# Patient Record
Sex: MALE | Race: WHITE | NOT HISPANIC OR LATINO
[De-identification: names, ages, dates, MRNs, and addresses within clinical notes are randomized per-mention and may not be internally consistent; named-entity substitution may affect disease eponyms.]

---

## 2021-04-22 ENCOUNTER — NON-APPOINTMENT (OUTPATIENT)
Age: 41
End: 2021-04-22

## 2021-04-22 ENCOUNTER — RX RENEWAL (OUTPATIENT)
Age: 41
End: 2021-04-22

## 2021-04-22 ENCOUNTER — APPOINTMENT (OUTPATIENT)
Dept: PULMONOLOGY | Facility: CLINIC | Age: 41
End: 2021-04-22
Payer: COMMERCIAL

## 2021-04-22 VITALS
WEIGHT: 250 LBS | HEART RATE: 93 BPM | BODY MASS INDEX: 35 KG/M2 | OXYGEN SATURATION: 100 % | HEIGHT: 71 IN | TEMPERATURE: 97.3 F | DIASTOLIC BLOOD PRESSURE: 88 MMHG | SYSTOLIC BLOOD PRESSURE: 133 MMHG

## 2021-04-22 DIAGNOSIS — Z01.812 ENCOUNTER FOR PREPROCEDURAL LABORATORY EXAMINATION: ICD-10-CM

## 2021-04-22 DIAGNOSIS — Z80.3 FAMILY HISTORY OF MALIGNANT NEOPLASM OF BREAST: ICD-10-CM

## 2021-04-22 DIAGNOSIS — Z20.822 ENCOUNTER FOR PREPROCEDURAL LABORATORY EXAMINATION: ICD-10-CM

## 2021-04-22 PROBLEM — Z00.00 ENCOUNTER FOR PREVENTIVE HEALTH EXAMINATION: Status: ACTIVE | Noted: 2021-04-22

## 2021-04-22 PROCEDURE — 99072 ADDL SUPL MATRL&STAF TM PHE: CPT

## 2021-04-22 PROCEDURE — 71046 X-RAY EXAM CHEST 2 VIEWS: CPT

## 2021-04-22 PROCEDURE — 99244 OFF/OP CNSLTJ NEW/EST MOD 40: CPT | Mod: 25

## 2021-04-22 RX ORDER — METFORMIN HYDROCHLORIDE 625 MG/1
TABLET ORAL
Refills: 0 | Status: ACTIVE | COMMUNITY

## 2021-04-22 NOTE — ASSESSMENT
[FreeTextEntry1] : possibly allergy related?\par \par trial of flonase and claritin D daily\par follow up for PFT

## 2021-04-22 NOTE — HISTORY OF PRESENT ILLNESS
[Current] : current [TextBox_4] : 41M HTN, HLD\par \par cough x 2 months\par took medrol pack without relief\par worse at night when lying down\par feels mucous in the throat\par no prior pulm hx\par denies asthma as a child/frequent infections\par denies GERD\par no associated shortness of breath\par \par works as a manager at [employer], no toxic exposures\par \par infrequent social smoking.

## 2021-04-22 NOTE — CONSULT LETTER
[FreeTextEntry1] : Dear  ,\par \par I had the pleasure of evaluating your patient, CARLEY WISEMAN today in pulmonary consultation.  Please refer to my attached note for my findings and recommendations.\par \par \par Thank you for allowing me to participate in the care of your patient, please feel free to call with any questions or concerns.\par \par \par Sincerely,\par \par Erum Mcnluty MD\par [facility] \par [facility]\par \par

## 2021-05-13 ENCOUNTER — APPOINTMENT (OUTPATIENT)
Dept: PULMONOLOGY | Facility: CLINIC | Age: 41
End: 2021-05-13
Payer: COMMERCIAL

## 2021-05-13 ENCOUNTER — RX RENEWAL (OUTPATIENT)
Age: 41
End: 2021-05-13

## 2021-05-13 VITALS
OXYGEN SATURATION: 99 % | HEART RATE: 90 BPM | SYSTOLIC BLOOD PRESSURE: 139 MMHG | DIASTOLIC BLOOD PRESSURE: 89 MMHG | TEMPERATURE: 96.7 F

## 2021-05-13 PROCEDURE — 99072 ADDL SUPL MATRL&STAF TM PHE: CPT

## 2021-05-13 PROCEDURE — 99214 OFFICE O/P EST MOD 30 MIN: CPT

## 2021-05-13 RX ORDER — CETIRIZINE HCL 10 MG
TABLET ORAL
Refills: 0 | Status: ACTIVE | COMMUNITY

## 2021-05-13 RX ORDER — OMEPRAZOLE 40 MG/1
40 CAPSULE, DELAYED RELEASE ORAL
Qty: 90 | Refills: 0 | Status: ACTIVE | COMMUNITY
Start: 2021-05-13 | End: 1900-01-01

## 2021-05-13 NOTE — ASSESSMENT
[FreeTextEntry1] : cont zyrtec, flonase\par add PPI for possible gerd\par trial of PRN albuterol\par fu for PFT, bring albuterol for test

## 2021-05-13 NOTE — PROCEDURE
[FreeTextEntry1] : Prior exam reviewed:\par CXR: clear lungs, no focal consolidation or pleural effusions, cardiac silhouette appears normal.  No bony abnormality.\par

## 2021-05-13 NOTE — HISTORY OF PRESENT ILLNESS
[TextBox_4] : still has cough\par mostly when lays down at night\par taking flonase and zyrtec\par no change\par no dyspnea, no wheeze

## 2021-09-22 ENCOUNTER — APPOINTMENT (OUTPATIENT)
Dept: PULMONOLOGY | Facility: CLINIC | Age: 41
End: 2021-09-22

## 2021-09-23 LAB — SARS-COV-2 N GENE NPH QL NAA+PROBE: NOT DETECTED

## 2021-09-27 ENCOUNTER — APPOINTMENT (OUTPATIENT)
Dept: PULMONOLOGY | Facility: CLINIC | Age: 41
End: 2021-09-27
Payer: COMMERCIAL

## 2021-09-27 ENCOUNTER — NON-APPOINTMENT (OUTPATIENT)
Age: 41
End: 2021-09-27

## 2021-09-27 VITALS
DIASTOLIC BLOOD PRESSURE: 83 MMHG | SYSTOLIC BLOOD PRESSURE: 129 MMHG | WEIGHT: 254 LBS | TEMPERATURE: 97.8 F | HEIGHT: 71 IN | HEART RATE: 85 BPM | RESPIRATION RATE: 15 BRPM | BODY MASS INDEX: 35.56 KG/M2 | OXYGEN SATURATION: 94 %

## 2021-09-27 LAB — POCT - HEMOGLOBIN (HGB), QUANTITATIVE, TRANSCUTANEOUS: 13.5

## 2021-09-27 PROCEDURE — 94727 GAS DIL/WSHOT DETER LNG VOL: CPT

## 2021-09-27 PROCEDURE — 94729 DIFFUSING CAPACITY: CPT

## 2021-09-27 PROCEDURE — 94060 EVALUATION OF WHEEZING: CPT

## 2021-09-27 PROCEDURE — 88738 HGB QUANT TRANSCUTANEOUS: CPT

## 2021-10-05 ENCOUNTER — APPOINTMENT (OUTPATIENT)
Dept: PULMONOLOGY | Facility: CLINIC | Age: 41
End: 2021-10-05
Payer: COMMERCIAL

## 2021-10-05 ENCOUNTER — LABORATORY RESULT (OUTPATIENT)
Age: 41
End: 2021-10-05

## 2021-10-05 ENCOUNTER — RX RENEWAL (OUTPATIENT)
Age: 41
End: 2021-10-05

## 2021-10-05 VITALS
HEIGHT: 71 IN | HEART RATE: 73 BPM | OXYGEN SATURATION: 98 % | TEMPERATURE: 97.8 F | DIASTOLIC BLOOD PRESSURE: 84 MMHG | SYSTOLIC BLOOD PRESSURE: 124 MMHG

## 2021-10-05 DIAGNOSIS — Z23 ENCOUNTER FOR IMMUNIZATION: ICD-10-CM

## 2021-10-05 PROCEDURE — 36415 COLL VENOUS BLD VENIPUNCTURE: CPT

## 2021-10-05 PROCEDURE — 90686 IIV4 VACC NO PRSV 0.5 ML IM: CPT

## 2021-10-05 PROCEDURE — 99214 OFFICE O/P EST MOD 30 MIN: CPT | Mod: 25

## 2021-10-05 PROCEDURE — G0008: CPT

## 2021-10-05 NOTE — PHYSICAL EXAM
[No Acute Distress] : no acute distress [Normal Oropharynx] : normal oropharynx [II] : Mallampati Class: II [Normal Appearance] : normal appearance [Supple] : supple [No JVD] : no jvd [Normal Rate/Rhythm] : normal rate/rhythm [Normal S1, S2] : normal s1, s2 [No Murmurs] : no murmurs [No Rubs] : no rubs [No Gallops] : no gallops [No Resp Distress] : no resp distress [No Acc Muscle Use] : no acc muscle use [Normal Palpation] : normal palpation [Normal Rhythm and Effort] : normal rhythm and effort [Clear to Auscultation Bilaterally] : clear to auscultation bilaterally [No Abnormalities] : no abnormalities [Benign] : benign [Not Tender] : not tender [Soft] : soft [No HSM] : no hsm [Normal Bowel Sounds] : normal bowel sounds [No Clubbing] : no clubbing [No Cyanosis] : no cyanosis [No Edema] : no edema [FROM] : FROM [Normal Color/ Pigmentation] : normal color/ pigmentation [No Focal Deficits] : no focal deficits [Oriented x3] : oriented x3 [Normal Affect] : normal affect

## 2021-10-05 NOTE — PROCEDURE
[FreeTextEntry1] : Prior exam reviewed:\par CXR: clear lungs, no focal consolidation or pleural effusions, cardiac silhouette appears normal.  No bony abnormality.\par \par PFT 9/27/21\par Mild to moderate restrictive ventilatory impairment\par No bronchodilator response in FEV1\par Total capacity 70% predicted\par Diffusion 76% predicted low normal range\par Cannot exclude restrictive ventilatory impairment\par HGB 13.5\par Clinical correlation advised\par Question weight patient 254 pounds contributing to the restrictive component \par \par Flu Vaccine IM 10/5/2021

## 2021-10-05 NOTE — DISCUSSION/SUMMARY
[FreeTextEntry1] : Subacute chronic cough\par Restrictive ventilatory impairment\par Cannot exclude underlying component of inflammatory airways disease\par Does have pets in the home\par Rule out cat dog allergy\par \par Recommendations we will try program with controller therapy\par Symbicort 160–4.5 MCG 2 puffs twice daily with instructions to rinse\par Generic Singulair 10 mg 1 tablet p.o. daily with food\par As needed short acting bronchodilator\par Asthma profile blood work\par Office follow-up\par Readdress PPI if symptoms do not resolve\par

## 2021-10-05 NOTE — HISTORY OF PRESENT ILLNESS
[TextBox_4] : still has cough\par Congestion with clear mucus\par No hemoptysis purulent sputum foul-smelling sputum\par pos  wheeze\par mostly when lays down at night\par taking flonase and zyrtec\par no change\par no dyspnea, \par Treatment and use of rescue inhaler unclear if there is any significant improvement\par No fevers chills or sweats\par

## 2021-10-06 ENCOUNTER — NON-APPOINTMENT (OUTPATIENT)
Age: 41
End: 2021-10-06

## 2021-10-06 LAB
BASOPHILS # BLD AUTO: 0.04 K/UL
BASOPHILS NFR BLD AUTO: 0.6 %
EOSINOPHIL # BLD AUTO: 0.16 K/UL
EOSINOPHIL NFR BLD AUTO: 2.5 %
HCT VFR BLD CALC: 42.5 %
HGB BLD-MCNC: 12.6 G/DL
IMM GRANULOCYTES NFR BLD AUTO: 0.5 %
LYMPHOCYTES # BLD AUTO: 2.36 K/UL
LYMPHOCYTES NFR BLD AUTO: 37.4 %
MAN DIFF?: NORMAL
MCHC RBC-ENTMCNC: 22.5 PG
MCHC RBC-ENTMCNC: 29.6 GM/DL
MCV RBC AUTO: 75.8 FL
MONOCYTES # BLD AUTO: 0.45 K/UL
MONOCYTES NFR BLD AUTO: 7.1 %
NEUTROPHILS # BLD AUTO: 3.27 K/UL
NEUTROPHILS NFR BLD AUTO: 51.9 %
PLATELET # BLD AUTO: 235 K/UL
RBC # BLD: 5.61 M/UL
RBC # FLD: 15.6 %
WBC # FLD AUTO: 6.31 K/UL

## 2021-10-09 ENCOUNTER — NON-APPOINTMENT (OUTPATIENT)
Age: 41
End: 2021-10-09

## 2021-10-09 DIAGNOSIS — Z91.018 ALLERGY TO OTHER FOODS: ICD-10-CM

## 2021-10-09 DIAGNOSIS — Z91.010 ALLERGY TO PEANUTS: ICD-10-CM

## 2021-10-09 LAB
A ALTERNATA IGE QN: <0.1 KUA/L
A FUMIGATUS IGE QN: <0.1 KUA/L
C ALBICANS IGE QN: <0.1 KUA/L
C HERBARUM IGE QN: <0.1 KUA/L
CAT DANDER IGE QN: 0.14 KUA/L
CLAM IGE QN: <0.1 KUA/L
CODFISH IGE QN: <0.1 KUA/L
COMMON RAGWEED IGE QN: 0.44 KUA/L
CORN IGE QN: 0.11 KUA/L
COW MILK IGE QN: <0.1 KUA/L
D FARINAE IGE QN: <0.1 KUA/L
D PTERONYSS IGE QN: <0.1 KUA/L
DEPRECATED A ALTERNATA IGE RAST QL: 0
DEPRECATED A FUMIGATUS IGE RAST QL: 0
DEPRECATED C ALBICANS IGE RAST QL: 0
DEPRECATED C HERBARUM IGE RAST QL: 0
DEPRECATED CAT DANDER IGE RAST QL: NORMAL
DEPRECATED CLAM IGE RAST QL: 0
DEPRECATED CODFISH IGE RAST QL: 0
DEPRECATED COMMON RAGWEED IGE RAST QL: 1
DEPRECATED CORN IGE RAST QL: NORMAL
DEPRECATED COW MILK IGE RAST QL: 0
DEPRECATED D FARINAE IGE RAST QL: 0
DEPRECATED D PTERONYSS IGE RAST QL: 0
DEPRECATED DOG DANDER IGE RAST QL: 0
DEPRECATED EGG WHITE IGE RAST QL: 0
DEPRECATED M RACEMOSUS IGE RAST QL: 0
DEPRECATED PEANUT IGE RAST QL: 1
DEPRECATED ROACH IGE RAST QL: 3
DEPRECATED SCALLOP IGE RAST QL: 0.15 KUA/L
DEPRECATED SESAME SEED IGE RAST QL: 1
DEPRECATED SHRIMP IGE RAST QL: 0
DEPRECATED SOYBEAN IGE RAST QL: NORMAL
DEPRECATED TIMOTHY IGE RAST QL: 1
DEPRECATED WALNUT IGE RAST QL: 0
DEPRECATED WHEAT IGE RAST QL: 1
DEPRECATED WHITE OAK IGE RAST QL: 2
DOG DANDER IGE QN: <0.1 KUA/L
EGG WHITE IGE QN: <0.1 KUA/L
M RACEMOSUS IGE QN: <0.1 KUA/L
PEANUT IGE QN: 0.47 KUA/L
ROACH IGE QN: 3.77 KUA/L
SCALLOP IGE QN: <0.1 KUA/L
SCALLOP IGE QN: NORMAL
SESAME SEED IGE QN: 0.4 KUA/L
SOYBEAN IGE QN: 0.11 KUA/L
TIMOTHY IGE QN: 0.63 KUA/L
WALNUT IGE QN: <0.1 KUA/L
WHEAT IGE QN: 0.43 KUA/L
WHITE OAK IGE QN: 1.49 KUA/L

## 2021-10-27 ENCOUNTER — APPOINTMENT (OUTPATIENT)
Dept: INTERNAL MEDICINE | Facility: CLINIC | Age: 41
End: 2021-10-27
Payer: COMMERCIAL

## 2021-10-27 ENCOUNTER — NON-APPOINTMENT (OUTPATIENT)
Age: 41
End: 2021-10-27

## 2021-10-27 VITALS
BODY MASS INDEX: 36.32 KG/M2 | WEIGHT: 253.73 LBS | HEART RATE: 68 BPM | TEMPERATURE: 97 F | DIASTOLIC BLOOD PRESSURE: 79 MMHG | SYSTOLIC BLOOD PRESSURE: 123 MMHG | HEIGHT: 70.08 IN | OXYGEN SATURATION: 98 %

## 2021-10-27 DIAGNOSIS — D64.9 ANEMIA, UNSPECIFIED: ICD-10-CM

## 2021-10-27 PROCEDURE — 99396 PREV VISIT EST AGE 40-64: CPT | Mod: 25

## 2021-10-27 PROCEDURE — 36415 COLL VENOUS BLD VENIPUNCTURE: CPT

## 2021-10-27 NOTE — ASSESSMENT
[FreeTextEntry1] : Patient presents for annual physical blood work to be done today advised to continue with recommendations as per pulmonology\par Continue with lifestyle modification for diet and exercise\par Advised dental care\par Patient did have the data hemoglobin done in September which was normal check iron studies CBC today.

## 2021-10-27 NOTE — HEALTH RISK ASSESSMENT
[Good] : ~his/her~  mood as  good [FreeTextEntry1] : health maintenance [] : No [No] : No [0] : 2) Feeling down, depressed, or hopeless: Not at all (0) [de-identified] : pulm

## 2021-10-27 NOTE — HISTORY OF PRESENT ILLNESS
[FreeTextEntry1] : Patient presents for annual physical [de-identified] : Currently having some chest congestion, following up with neurologist\par Denies any fevers chills night sweats chest pain\par Denies any problems urination passing stool\par Lost 20 pounds with lifestyle modification\par He has a history of anemia unsure what kind started incorporating more spinach and liver into the diet

## 2021-11-01 LAB
25(OH)D3 SERPL-MCNC: 21.2 NG/ML
ALBUMIN SERPL ELPH-MCNC: 5.1 G/DL
ALP BLD-CCNC: 64 U/L
ALT SERPL-CCNC: 34 U/L
ANION GAP SERPL CALC-SCNC: 18 MMOL/L
APPEARANCE: CLEAR
APPEARANCE: CLEAR
AST SERPL-CCNC: 24 U/L
BACTERIA: NEGATIVE
BASOPHILS # BLD AUTO: 0.05 K/UL
BASOPHILS NFR BLD AUTO: 0.7 %
BILIRUB SERPL-MCNC: 0.4 MG/DL
BILIRUBIN URINE: NEGATIVE
BILIRUBIN URINE: NEGATIVE
BLOOD URINE: NEGATIVE
BLOOD URINE: NEGATIVE
BUN SERPL-MCNC: 12 MG/DL
CALCIUM SERPL-MCNC: 10.1 MG/DL
CHLORIDE SERPL-SCNC: 103 MMOL/L
CHOLEST SERPL-MCNC: 183 MG/DL
CO2 SERPL-SCNC: 22 MMOL/L
COLOR: YELLOW
COLOR: YELLOW
CREAT SERPL-MCNC: 0.97 MG/DL
EOSINOPHIL # BLD AUTO: 0.12 K/UL
EOSINOPHIL NFR BLD AUTO: 1.6 %
ESTIMATED AVERAGE GLUCOSE: 146 MG/DL
FERRITIN SERPL-MCNC: 76 NG/ML
FOLATE SERPL-MCNC: 11 NG/ML
GLUCOSE QUALITATIVE U: NEGATIVE
GLUCOSE QUALITATIVE U: NEGATIVE
GLUCOSE SERPL-MCNC: 107 MG/DL
HBA1C MFR BLD HPLC: 6.7 %
HCT VFR BLD CALC: 42 %
HDLC SERPL-MCNC: 45 MG/DL
HGB BLD-MCNC: 12.6 G/DL
HYALINE CASTS: 0 /LPF
IMM GRANULOCYTES NFR BLD AUTO: 0.5 %
IRON SATN MFR SERPL: 24 %
IRON SERPL-MCNC: 94 UG/DL
KETONES URINE: NEGATIVE
KETONES URINE: NEGATIVE
LDLC SERPL CALC-MCNC: 106 MG/DL
LEUKOCYTE ESTERASE URINE: NEGATIVE
LEUKOCYTE ESTERASE URINE: NEGATIVE
LYMPHOCYTES # BLD AUTO: 2.91 K/UL
LYMPHOCYTES NFR BLD AUTO: 38.1 %
MAN DIFF?: NORMAL
MCHC RBC-ENTMCNC: 22.1 PG
MCHC RBC-ENTMCNC: 30 GM/DL
MCV RBC AUTO: 73.7 FL
MICROSCOPIC-UA: NORMAL
MONOCYTES # BLD AUTO: 0.46 K/UL
MONOCYTES NFR BLD AUTO: 6 %
NEUTROPHILS # BLD AUTO: 4.06 K/UL
NEUTROPHILS NFR BLD AUTO: 53.1 %
NITRITE URINE: NEGATIVE
NITRITE URINE: NEGATIVE
NONHDLC SERPL-MCNC: 138 MG/DL
PH URINE: 5.5
PH URINE: 5.5
PLATELET # BLD AUTO: 228 K/UL
POTASSIUM SERPL-SCNC: 4.7 MMOL/L
PROT SERPL-MCNC: 7.9 G/DL
PROTEIN URINE: NEGATIVE
PROTEIN URINE: NEGATIVE
PSA SERPL-MCNC: 0.55 NG/ML
RBC # BLD: 5.7 M/UL
RBC # FLD: 15.2 %
RED BLOOD CELLS URINE: 3 /HPF
SODIUM SERPL-SCNC: 142 MMOL/L
SPECIFIC GRAVITY URINE: 1.02
SPECIFIC GRAVITY URINE: 1.02
SQUAMOUS EPITHELIAL CELLS: 1 /HPF
TIBC SERPL-MCNC: 399 UG/DL
TRIGL SERPL-MCNC: 158 MG/DL
TSH SERPL-ACNC: 0.87 UIU/ML
UIBC SERPL-MCNC: 305 UG/DL
UROBILINOGEN URINE: NORMAL
UROBILINOGEN URINE: NORMAL
VIT B12 SERPL-MCNC: 906 PG/ML
WBC # FLD AUTO: 7.64 K/UL
WHITE BLOOD CELLS URINE: 1 /HPF

## 2021-11-09 ENCOUNTER — APPOINTMENT (OUTPATIENT)
Dept: PULMONOLOGY | Facility: CLINIC | Age: 41
End: 2021-11-09

## 2021-11-10 ENCOUNTER — APPOINTMENT (OUTPATIENT)
Dept: PULMONOLOGY | Facility: CLINIC | Age: 41
End: 2021-11-10

## 2021-11-16 ENCOUNTER — RX RENEWAL (OUTPATIENT)
Age: 41
End: 2021-11-16

## 2021-11-16 ENCOUNTER — APPOINTMENT (OUTPATIENT)
Dept: PULMONOLOGY | Facility: CLINIC | Age: 41
End: 2021-11-16
Payer: COMMERCIAL

## 2021-11-16 DIAGNOSIS — R05.9 COUGH, UNSPECIFIED: ICD-10-CM

## 2021-11-16 DIAGNOSIS — R76.8 OTHER SPECIFIED ABNORMAL IMMUNOLOGICAL FINDINGS IN SERUM: ICD-10-CM

## 2021-11-16 DIAGNOSIS — R94.2 ABNORMAL RESULTS OF PULMONARY FUNCTION STUDIES: ICD-10-CM

## 2021-11-16 DIAGNOSIS — R09.82 POSTNASAL DRIP: ICD-10-CM

## 2021-11-16 DIAGNOSIS — Z20.822 CONTACT WITH AND (SUSPECTED) EXPOSURE TO COVID-19: ICD-10-CM

## 2021-11-16 PROCEDURE — 99213 OFFICE O/P EST LOW 20 MIN: CPT | Mod: 95

## 2021-11-16 NOTE — DISCUSSION/SUMMARY
[FreeTextEntry1] : Subacute chronic cough\par Restrictive ventilatory impairment\par Cannot exclude underlying component of inflammatory airways disease\par Does have pets in the home\par PNDS component\par \par Recommendations we will try program with controller therapy-change Symbicort to BREZTRI  2 puffs BID and Rinse\par Symbicort 160–4.5 MCG 2 puffs twice daily with instructions to rinse HOLD\par Generic Singulair 10 mg 1 tablet p.o. daily with food- continue\par As needed short acting bronchodilator\par Asthma profile blood work reviewed\par Office follow-up\par Readdress PPI if symptoms do not resolve- neg GERD sxs can be silent\par TX PND\par Flonase nasal atorvent\par  medrol Jose Elias- noted PREDM- hydrate with medrol\par  DOXY\par f/u 1 mos and PFT/ pratibha with BD

## 2021-11-16 NOTE — HISTORY OF PRESENT ILLNESS
[Home] : at home, [unfilled] , at the time of the visit. [Medical Office: ([facility])___] : at the medical office located in  [Verbal consent obtained from patient] : the patient, [unfilled] [TextBox_4] : Still with cough\par It is unclear if there is a component from the upper airway nasal drip symptomatology as well as chest congestion\par He is not describing active wheezing\par This persistent subacute cough is ongoing\par He was placed on Symbicort Singulair does not feel it is dramatically improved he had some initial improvement but then return of symptoms\par Reviewed with patient and noted elevated serum IgE level 163 but L otherwise asthma profile Food IgE mix unremarkable\par Eosinophil count normal range\par \par No fevers chills or sweats\par

## 2022-01-14 ENCOUNTER — APPOINTMENT (OUTPATIENT)
Dept: PULMONOLOGY | Facility: CLINIC | Age: 42
End: 2022-01-14

## 2022-02-21 ENCOUNTER — APPOINTMENT (OUTPATIENT)
Dept: INTERNAL MEDICINE | Facility: CLINIC | Age: 42
End: 2022-02-21
Payer: COMMERCIAL

## 2022-02-21 ENCOUNTER — RESULT REVIEW (OUTPATIENT)
Age: 42
End: 2022-02-21

## 2022-02-21 ENCOUNTER — RX RENEWAL (OUTPATIENT)
Age: 42
End: 2022-02-21

## 2022-02-21 VITALS
TEMPERATURE: 97.4 F | HEART RATE: 77 BPM | DIASTOLIC BLOOD PRESSURE: 95 MMHG | BODY MASS INDEX: 35 KG/M2 | WEIGHT: 250 LBS | OXYGEN SATURATION: 98 % | HEIGHT: 71 IN | SYSTOLIC BLOOD PRESSURE: 132 MMHG

## 2022-02-21 DIAGNOSIS — M54.50 LOW BACK PAIN, UNSPECIFIED: ICD-10-CM

## 2022-02-21 DIAGNOSIS — S93.401A SPRAIN OF UNSPECIFIED LIGAMENT OF RIGHT ANKLE, INITIAL ENCOUNTER: ICD-10-CM

## 2022-02-21 DIAGNOSIS — R09.89 OTHER SPECIFIED SYMPTOMS AND SIGNS INVOLVING THE CIRCULATORY AND RESPIRATORY SYSTEMS: ICD-10-CM

## 2022-02-21 PROCEDURE — 99213 OFFICE O/P EST LOW 20 MIN: CPT

## 2022-02-21 NOTE — HISTORY OF PRESENT ILLNESS
[FreeTextEntry8] : Patient presents for acute evaluation of low back pain, tightness which started when he lifted a heavy radiator 1.5 months ago.\par He notes pain is uncomfortable during sleep and took 3 Aleve, with minimal relief, 1 day ago. The pain is worse with walking and movement.\par Denies weakness, numbness, night sweats, or abdominal pain.\par He reports to have hx of inflamed back a/w overuse with work. \par Saw a chiropractor before, was not able to go back for f/u.\par Also reports a R ankle sprain which occurred a few months ago and noted that he had difficulty weightbearing.\par Pt reports of his asthma, uses inhaler with some improvements. Symptom is worse when lying down on the left side and stress.\par Denies heartburn, post nasal drip\par SocHx: Occasionally smokes

## 2022-02-21 NOTE — ASSESSMENT
[FreeTextEntry1] : -Will check routine labs and blood work\par \par Back Pain secondary to diskitis, arthritis, spondylolisthesis\par -Anti-inflammatory muscle relaxant given\par -Given failure of improvement despite conservative measures and home stretching program will order MRI\par -F/u after result\par \par R Ankle Sprain\par -advised ankle brace given difficulty weightbearing\par -order ankle XR, script given \par \par Chronic Cough\par -alpha 1 antitrypsin for chronic cough given PFT result

## 2022-02-21 NOTE — PHYSICAL EXAM
[Normal] : normal gait, coordination grossly intact, no focal deficits and deep tendon reflexes were 2+ and symmetric [No CVA Tenderness] : no CVA  tenderness [No Spinal Tenderness] : no spinal tenderness [de-identified] : discomfort on flexion and extension of the spine

## 2022-02-21 NOTE — ADDENDUM
[FreeTextEntry1] : I, Rios Singh, acted solely as a scribe for Dr. Bonifacio Granado M.D. on this date 02/21/2022  .\par  \par All medical record entries made by the Scribe were at my, Dr. Bonifacio Granado M.D., direction and personally dictated by me on 02/21/2022 . I have reviewed the chart and agree that the record accurately reflects my personal performance of the history, physical exam, assessment and plan. I have also personally directed, reviewed, and agreed with the chart.

## 2022-02-22 ENCOUNTER — APPOINTMENT (OUTPATIENT)
Dept: RADIOLOGY | Facility: IMAGING CENTER | Age: 42
End: 2022-02-22
Payer: COMMERCIAL

## 2022-02-22 ENCOUNTER — OUTPATIENT (OUTPATIENT)
Dept: OUTPATIENT SERVICES | Facility: HOSPITAL | Age: 42
LOS: 1 days | End: 2022-02-22
Payer: COMMERCIAL

## 2022-02-22 DIAGNOSIS — S93.401A SPRAIN OF UNSPECIFIED LIGAMENT OF RIGHT ANKLE, INITIAL ENCOUNTER: ICD-10-CM

## 2022-02-22 PROCEDURE — 72110 X-RAY EXAM L-2 SPINE 4/>VWS: CPT

## 2022-02-22 PROCEDURE — 73610 X-RAY EXAM OF ANKLE: CPT | Mod: 26,RT

## 2022-02-22 PROCEDURE — 72110 X-RAY EXAM L-2 SPINE 4/>VWS: CPT | Mod: 26

## 2022-02-22 PROCEDURE — 73610 X-RAY EXAM OF ANKLE: CPT

## 2022-02-24 LAB — A1AT SERPL-MCNC: 119 MG/DL

## 2022-03-08 ENCOUNTER — APPOINTMENT (OUTPATIENT)
Dept: MRI IMAGING | Facility: IMAGING CENTER | Age: 42
End: 2022-03-08

## 2022-08-12 ENCOUNTER — APPOINTMENT (OUTPATIENT)
Dept: PULMONOLOGY | Facility: CLINIC | Age: 42
End: 2022-08-12

## 2022-08-12 ENCOUNTER — RX RENEWAL (OUTPATIENT)
Age: 42
End: 2022-08-12

## 2022-08-12 VITALS
BODY MASS INDEX: 35 KG/M2 | SYSTOLIC BLOOD PRESSURE: 120 MMHG | HEIGHT: 71 IN | DIASTOLIC BLOOD PRESSURE: 83 MMHG | HEART RATE: 79 BPM | WEIGHT: 250 LBS | OXYGEN SATURATION: 97 % | TEMPERATURE: 97.5 F

## 2022-08-12 DIAGNOSIS — R05.3 CHRONIC COUGH: ICD-10-CM

## 2022-08-12 DIAGNOSIS — J98.4 OTHER DISORDERS OF LUNG: ICD-10-CM

## 2022-08-12 DIAGNOSIS — R53.83 OTHER FATIGUE: ICD-10-CM

## 2022-08-12 DIAGNOSIS — R06.83 SNORING: ICD-10-CM

## 2022-08-12 LAB — POCT - HEMOGLOBIN (HGB), QUANTITATIVE, TRANSCUTANEOUS: 12.8

## 2022-08-12 PROCEDURE — 94727 GAS DIL/WSHOT DETER LNG VOL: CPT

## 2022-08-12 PROCEDURE — 95012 NITRIC OXIDE EXP GAS DETER: CPT

## 2022-08-12 PROCEDURE — 94729 DIFFUSING CAPACITY: CPT

## 2022-08-12 PROCEDURE — 71046 X-RAY EXAM CHEST 2 VIEWS: CPT

## 2022-08-12 PROCEDURE — ZZZZZ: CPT

## 2022-08-12 PROCEDURE — 99214 OFFICE O/P EST MOD 30 MIN: CPT | Mod: 25

## 2022-08-12 PROCEDURE — 88738 HGB QUANT TRANSCUTANEOUS: CPT

## 2022-08-12 PROCEDURE — 94060 EVALUATION OF WHEEZING: CPT

## 2022-08-12 RX ORDER — METHYLPREDNISOLONE 4 MG/1
4 TABLET ORAL
Qty: 1 | Refills: 0 | Status: DISCONTINUED | COMMUNITY
Start: 2021-11-16 | End: 2022-08-12

## 2022-08-12 RX ORDER — PANTOPRAZOLE 40 MG/1
40 TABLET, DELAYED RELEASE ORAL
Qty: 90 | Refills: 0 | Status: ACTIVE | COMMUNITY
Start: 2022-08-12 | End: 1900-01-01

## 2022-08-13 NOTE — PHYSICAL EXAM
[No Acute Distress] : no acute distress [Normal Oropharynx] : normal oropharynx [II] : Mallampati Class: II [Normal Appearance] : normal appearance [Supple] : supple [No JVD] : no jvd [Normal Rate/Rhythm] : normal rate/rhythm [Normal S1, S2] : normal s1, s2 [No Murmurs] : no murmurs [No Rubs] : no rubs [No Gallops] : no gallops [No Resp Distress] : no resp distress [No Acc Muscle Use] : no acc muscle use [Normal Palpation] : normal palpation [Normal Rhythm and Effort] : normal rhythm and effort [Clear to Auscultation Bilaterally] : clear to auscultation bilaterally [No Abnormalities] : no abnormalities [Benign] : benign [Not Tender] : not tender [Soft] : soft [No HSM] : no hsm [Normal Bowel Sounds] : normal bowel sounds [No Clubbing] : no clubbing [No Cyanosis] : no cyanosis [No Edema] : no edema [FROM] : FROM [Normal Color/ Pigmentation] : normal color/ pigmentation [No Focal Deficits] : no focal deficits [Oriented x3] : oriented x3 [Normal Affect] : normal affect [Normal to Percussion] : normal to percussion

## 2022-08-13 NOTE — HISTORY OF PRESENT ILLNESS
[Awakes Unrefreshed] : awakes unrefreshed [Awakes with Dry Mouth] : awakes with dry mouth [Fatigue] : fatigue [Frequent Nocturnal Awakening] : frequent nocturnal awakening [Recent  Weight Gain] : recent weight gain [Snoring] : snoring [Former] : former [TextBox_4] : Here for continued persistent cough for past few years. Gets better and worse. \par intermittent but back for past 3 weeks.\par worse when lying down on left side. Positive nocturnal \par gets up light clear mucus or dry. \par has wheeze intermittent worse when lying down or sitting\par some chest tightness\par tried inhalers in past without help\par Feels some limitation due to breathing. Probable MOORE 3-4 flights.\par  not taking Flonase and Zyrtec, did not help\par many years ago ENT visit, told OK. Denies PN Drip.\par has GERD in past not lately  \par no dyspnea,\par \par snores\par did have a sleep Eval and was suppose to get CPAP but never responded. \par had rast/ Ige with allergies\par had COVID 2020 mild\par \par Wt stable.  [TextBox_11] : [employer] [YearQuit] : 2022 [Awakes with Headache] : does not awaken with headache

## 2022-08-13 NOTE — PROCEDURE
[FreeTextEntry1] : \par 08/12/2022\par Pulmonary function testing\par There is a moderate ventilatory impairment in a restrictive pattern There is no significant bronchodilator response. There is moderate reduction in lung volume. There is elevation in the RV/TLC ratio indicative of possible air trapping. There is a mild-mod diffusion impairment Corrects to normal with lung volume correction \par \par \par

## 2022-08-13 NOTE — ASSESSMENT
[FreeTextEntry1] : Trial of Pantoprazole.  Instructed to take 1/2-hour prior to dinner.\par High-resolution CT of the chest.\par Follow-up in few weeks to review CAT scan and ascertain response to therapy.\par Further recommendations based upon above.\par

## 2022-08-13 NOTE — DISCUSSION/SUMMARY
[FreeTextEntry1] : Cough hypersensitivity syndrome.\par \par Possible etiologies include\par Upper airway cough syndrome.\par Laryngeal pharyngeal reflux disease.\par Cough variant asthma.  Argue against his lack of flow limitation as well as normal NIOX.\par Nonasthmatic eosinophilic bronchitis.  Less likely.\par \par Restrictive ventilatory impairment component related to body habitus.  Will obtain high-resolution CT to rule out occult interstitial disease.  Likely no significant neuromuscular disease based on clinical scenario.\par \par \par \par \par \par

## 2022-09-02 ENCOUNTER — APPOINTMENT (OUTPATIENT)
Dept: PULMONOLOGY | Facility: CLINIC | Age: 42
End: 2022-09-02

## 2022-09-23 ENCOUNTER — APPOINTMENT (OUTPATIENT)
Dept: INTERNAL MEDICINE | Facility: CLINIC | Age: 42
End: 2022-09-23

## 2022-09-23 VITALS
WEIGHT: 260 LBS | SYSTOLIC BLOOD PRESSURE: 130 MMHG | BODY MASS INDEX: 36.4 KG/M2 | HEIGHT: 71 IN | OXYGEN SATURATION: 100 % | DIASTOLIC BLOOD PRESSURE: 85 MMHG | HEART RATE: 70 BPM

## 2022-09-23 DIAGNOSIS — M25.562 PAIN IN RIGHT KNEE: ICD-10-CM

## 2022-09-23 DIAGNOSIS — M25.561 PAIN IN RIGHT KNEE: ICD-10-CM

## 2022-09-23 DIAGNOSIS — M25.549 PAIN IN JOINTS OF UNSPECIFIED HAND: ICD-10-CM

## 2022-09-23 PROCEDURE — 99213 OFFICE O/P EST LOW 20 MIN: CPT | Mod: 25

## 2022-09-23 PROCEDURE — 36415 COLL VENOUS BLD VENIPUNCTURE: CPT

## 2022-09-23 PROCEDURE — 99396 PREV VISIT EST AGE 40-64: CPT | Mod: 25

## 2022-09-24 NOTE — HEALTH RISK ASSESSMENT
[Good] : ~his/her~  mood as  good [FreeTextEntry1] : Health Maintenance, Knee pain [de-identified] : Pulmonology.

## 2022-09-24 NOTE — ASSESSMENT
[FreeTextEntry1] : Annual Physical Exam\par -Blood work done today\par -Check A1c, lipid panel and Vitamin levels.\par -BP is stable. \par -Likely arthritis on Base of thumb. Hand XR ordered. -Knee pain maybe tendon or d/t overuse. Knee XR ordered. Advised to Use Voltaren gel\par -Fluzone Administered today.\par -Discussed lifestyle modification and healthy diet.\par -RTO annually or as needed

## 2022-09-24 NOTE — ADDENDUM
[FreeTextEntry1] : I, Shereen Florence, acted as a scribe on behalf of Dr. Bonifacio Granado MD, on 09/23/2022. \par \par All medical entries made by the scribe were at my, Dr. Bonifacio Granado MD, direction and personally dictated by me on 09/23/2022. I have reviewed the chart and agree that the record accurately reflects my personal performance of the history, physical exam, assessment and plan. I have also personally directed, reviewed, and agreed with the chart.

## 2022-09-24 NOTE — HISTORY OF PRESENT ILLNESS
[FreeTextEntry1] : Patient presents for annual physical exam. [de-identified] : Patient reports cough was a/w reflux. Has f/u with pulmonology. \par Was given PPI with minimal alleviation. Unsure of heartburn. \par Denies any metallic taste in mouth, difficulty swallowing, regurgitation.\par \par Pt also c/o L knee pain, had episodes of swelling. States he has been going up and down stairs. Pain has been getting better.\par Also have pain on base of thumb, worse with gripping.\par \par Sometimes would get wheezing. Denies any CP, chest tightness or SOB\par Denies any urinary symptom or changes in bowel habits.\par Has some insomnia d/t stress and fatigue.\par Reports daughter was diagnosed with leukemia about 3 months ago. Found that Ca does run in the family. States stress is manageable.\par Has Hx of ASHLEY and did not get CPAP machine.\par \par Has been attempting to watch his diet however trying to active.\par Denies any Hx of prostate or colon Ca.\par Interested in flu shot.

## 2022-09-24 NOTE — PHYSICAL EXAM
[No Acute Distress] : no acute distress [Well Nourished] : well nourished [Well Developed] : well developed [Well-Appearing] : well-appearing [Normal Sclera/Conjunctiva] : normal sclera/conjunctiva [PERRL] : pupils equal round and reactive to light [EOMI] : extraocular movements intact [Normal Outer Ear/Nose] : the outer ears and nose were normal in appearance [Normal Oropharynx] : the oropharynx was normal [No JVD] : no jugular venous distention [No Lymphadenopathy] : no lymphadenopathy [Supple] : supple [Thyroid Normal, No Nodules] : the thyroid was normal and there were no nodules present [No Respiratory Distress] : no respiratory distress  [No Accessory Muscle Use] : no accessory muscle use [Clear to Auscultation] : lungs were clear to auscultation bilaterally [Normal Rate] : normal rate  [Regular Rhythm] : with a regular rhythm [Normal S1, S2] : normal S1 and S2 [No Murmur] : no murmur heard [No Carotid Bruits] : no carotid bruits [No Abdominal Bruit] : a ~M bruit was not heard ~T in the abdomen [No Varicosities] : no varicosities [Pedal Pulses Present] : the pedal pulses are present [No Edema] : there was no peripheral edema [No Palpable Aorta] : no palpable aorta [No Extremity Clubbing/Cyanosis] : no extremity clubbing/cyanosis [Soft] : abdomen soft [Non Tender] : non-tender [Non-distended] : non-distended [No Masses] : no abdominal mass palpated [No HSM] : no HSM [Normal Bowel Sounds] : normal bowel sounds [Normal Posterior Cervical Nodes] : no posterior cervical lymphadenopathy [Normal Anterior Cervical Nodes] : no anterior cervical lymphadenopathy [No CVA Tenderness] : no CVA  tenderness [No Spinal Tenderness] : no spinal tenderness [No Joint Swelling] : no joint swelling [Grossly Normal Strength/Tone] : grossly normal strength/tone [No Rash] : no rash [Coordination Grossly Intact] : coordination grossly intact [No Focal Deficits] : no focal deficits [Normal Gait] : normal gait [Deep Tendon Reflexes (DTR)] : deep tendon reflexes were 2+ and symmetric [Normal Affect] : the affect was normal [Normal Insight/Judgement] : insight and judgment were intact [de-identified] : tenderness over base of the thumb

## 2022-09-27 LAB
25(OH)D3 SERPL-MCNC: 22.2 NG/ML
ALBUMIN SERPL ELPH-MCNC: 4.9 G/DL
ALP BLD-CCNC: 63 U/L
ALT SERPL-CCNC: 43 U/L
ANION GAP SERPL CALC-SCNC: 12 MMOL/L
APPEARANCE: CLEAR
AST SERPL-CCNC: 24 U/L
BACTERIA: NEGATIVE
BASOPHILS # BLD AUTO: 0.04 K/UL
BASOPHILS NFR BLD AUTO: 0.8 %
BILIRUB SERPL-MCNC: 0.3 MG/DL
BILIRUBIN URINE: NEGATIVE
BLOOD URINE: NEGATIVE
BUN SERPL-MCNC: 14 MG/DL
CALCIUM SERPL-MCNC: 10 MG/DL
CHLORIDE SERPL-SCNC: 103 MMOL/L
CHOLEST SERPL-MCNC: 238 MG/DL
CO2 SERPL-SCNC: 26 MMOL/L
COLOR: COLORLESS
CREAT SERPL-MCNC: 0.91 MG/DL
EGFR: 107 ML/MIN/1.73M2
EOSINOPHIL # BLD AUTO: 0.16 K/UL
EOSINOPHIL NFR BLD AUTO: 3 %
ESTIMATED AVERAGE GLUCOSE: 146 MG/DL
GLUCOSE QUALITATIVE U: NEGATIVE
GLUCOSE SERPL-MCNC: 110 MG/DL
HBA1C MFR BLD HPLC: 6.7 %
HCT VFR BLD CALC: 40.4 %
HDLC SERPL-MCNC: 44 MG/DL
HGB BLD-MCNC: 12.5 G/DL
HYALINE CASTS: 0 /LPF
IMM GRANULOCYTES NFR BLD AUTO: 0.4 %
KETONES URINE: NEGATIVE
LDLC SERPL CALC-MCNC: 158 MG/DL
LEUKOCYTE ESTERASE URINE: NEGATIVE
LYMPHOCYTES # BLD AUTO: 2.25 K/UL
LYMPHOCYTES NFR BLD AUTO: 42.6 %
MAN DIFF?: NORMAL
MCHC RBC-ENTMCNC: 22.5 PG
MCHC RBC-ENTMCNC: 30.9 GM/DL
MCV RBC AUTO: 72.8 FL
MICROSCOPIC-UA: NORMAL
MONOCYTES # BLD AUTO: 0.36 K/UL
MONOCYTES NFR BLD AUTO: 6.8 %
NEUTROPHILS # BLD AUTO: 2.45 K/UL
NEUTROPHILS NFR BLD AUTO: 46.4 %
NITRITE URINE: NEGATIVE
NONHDLC SERPL-MCNC: 193 MG/DL
PH URINE: 6
PLATELET # BLD AUTO: 232 K/UL
POTASSIUM SERPL-SCNC: 4.4 MMOL/L
PROT SERPL-MCNC: 7.5 G/DL
PROTEIN URINE: NEGATIVE
PSA SERPL-MCNC: 0.46 NG/ML
RBC # BLD: 5.55 M/UL
RBC # FLD: 15.6 %
RED BLOOD CELLS URINE: 1 /HPF
SODIUM SERPL-SCNC: 141 MMOL/L
SPECIFIC GRAVITY URINE: 1.01
SQUAMOUS EPITHELIAL CELLS: 0 /HPF
TRIGL SERPL-MCNC: 177 MG/DL
TSH SERPL-ACNC: 1.29 UIU/ML
UROBILINOGEN URINE: NORMAL
VIT B12 SERPL-MCNC: 713 PG/ML
WBC # FLD AUTO: 5.28 K/UL
WHITE BLOOD CELLS URINE: 0 /HPF

## 2022-09-29 ENCOUNTER — NON-APPOINTMENT (OUTPATIENT)
Age: 42
End: 2022-09-29

## 2022-09-29 DIAGNOSIS — B35.1 TINEA UNGUIUM: ICD-10-CM

## 2022-12-22 ENCOUNTER — APPOINTMENT (OUTPATIENT)
Dept: INTERNAL MEDICINE | Facility: CLINIC | Age: 42
End: 2022-12-22

## 2022-12-22 ENCOUNTER — NON-APPOINTMENT (OUTPATIENT)
Age: 42
End: 2022-12-22

## 2022-12-22 VITALS
WEIGHT: 250 LBS | TEMPERATURE: 97.6 F | OXYGEN SATURATION: 99 % | HEART RATE: 73 BPM | DIASTOLIC BLOOD PRESSURE: 73 MMHG | BODY MASS INDEX: 35 KG/M2 | SYSTOLIC BLOOD PRESSURE: 118 MMHG | HEIGHT: 71 IN

## 2022-12-22 DIAGNOSIS — R03.0 ELEVATED BLOOD-PRESSURE READING, W/OUT DIAGNOSIS OF HYPERTENSION: ICD-10-CM

## 2022-12-22 PROCEDURE — 93000 ELECTROCARDIOGRAM COMPLETE: CPT

## 2022-12-22 PROCEDURE — 99213 OFFICE O/P EST LOW 20 MIN: CPT | Mod: 25

## 2022-12-22 RX ORDER — AMLODIPINE BESYLATE 5 MG/1
5 TABLET ORAL
Qty: 90 | Refills: 3 | Status: ACTIVE | COMMUNITY
Start: 2022-12-22 | End: 1900-01-01

## 2022-12-23 NOTE — HISTORY OF PRESENT ILLNESS
[FreeTextEntry1] : Patient presents for follow-up. [de-identified] : Patient presents to the office after having found elevated blood pressure readings at home, 142/94 did have an associated headache denies any blurry vision numbness weakness\par Denies any nausea vomiting chest pain chest tightness has been working nights and feels has been drinking more caffeine denies any increased stress denies any snoring\par Has been eating more takeout food.

## 2022-12-23 NOTE — PHYSICAL EXAM
[Normal] : normal rate, regular rhythm, normal S1 and S2 and no murmur heard [de-identified] : no abdominal bruits appreciated

## 2022-12-23 NOTE — ADDENDUM
[FreeTextEntry1] : I, Shereen Florence, acted as a scribe on behalf of Dr. Bonifacio Granado MD, on 12/22/2022. \par \par All medical entries made by the scribe were at my, Dr. Bonifacio Granado MD, direction and personally dictated by me on 12/22/2022. I have reviewed the chart and agree that the record accurately reflects my personal performance of the history, physical exam, assessment and plan. I have also personally directed, reviewed, and agreed with the chart.

## 2022-12-27 LAB
ALBUMIN SERPL ELPH-MCNC: 4.6 G/DL
ALP BLD-CCNC: 67 U/L
ALT SERPL-CCNC: 43 U/L
ANION GAP SERPL CALC-SCNC: 11 MMOL/L
AST SERPL-CCNC: 25 U/L
BILIRUB SERPL-MCNC: 0.4 MG/DL
BUN SERPL-MCNC: 16 MG/DL
CALCIUM SERPL-MCNC: 9.8 MG/DL
CHLORIDE SERPL-SCNC: 99 MMOL/L
CO2 SERPL-SCNC: 26 MMOL/L
CREAT SERPL-MCNC: 0.93 MG/DL
EGFR: 104 ML/MIN/1.73M2
GLUCOSE SERPL-MCNC: 139 MG/DL
POTASSIUM SERPL-SCNC: 4.3 MMOL/L
PROT SERPL-MCNC: 7.3 G/DL
SODIUM SERPL-SCNC: 137 MMOL/L
T4 FREE SERPL-MCNC: 1.6 NG/DL
TSH SERPL-ACNC: 1.43 UIU/ML

## 2023-04-07 ENCOUNTER — APPOINTMENT (OUTPATIENT)
Dept: INTERNAL MEDICINE | Facility: CLINIC | Age: 43
End: 2023-04-07
Payer: COMMERCIAL

## 2023-04-07 VITALS
TEMPERATURE: 98.3 F | DIASTOLIC BLOOD PRESSURE: 79 MMHG | HEART RATE: 100 BPM | SYSTOLIC BLOOD PRESSURE: 127 MMHG | BODY MASS INDEX: 35.7 KG/M2 | WEIGHT: 255 LBS | OXYGEN SATURATION: 98 % | HEIGHT: 71 IN

## 2023-04-07 DIAGNOSIS — H60.509 UNSPECIFIED ACUTE NONINFECTIVE OTITIS EXTERNA, UNSPECIFIED EAR: ICD-10-CM

## 2023-04-07 DIAGNOSIS — G89.29 PAIN IN UNSPECIFIED WRIST: ICD-10-CM

## 2023-04-07 DIAGNOSIS — J40 BRONCHITIS, NOT SPECIFIED AS ACUTE OR CHRONIC: ICD-10-CM

## 2023-04-07 DIAGNOSIS — M25.539 PAIN IN UNSPECIFIED WRIST: ICD-10-CM

## 2023-04-07 PROCEDURE — 99213 OFFICE O/P EST LOW 20 MIN: CPT

## 2023-04-07 NOTE — ASSESSMENT
[FreeTextEntry1] : Bronchitis\par -Continue current management. If worsening symptoms to call office.\par \par Otitis Externa\par -Advised to discontinue using Qtips. Otic drops sent to pharmacy.\par \par Wrist Pain\par -Wrist and hand XR ordered, possible OA, tendonitis
No

## 2023-04-07 NOTE — HISTORY OF PRESENT ILLNESS
[FreeTextEntry1] : Patient presents for follow-up.  [de-identified] : Patient reports he went to urgent care for coughing, fatigue, phlegm production, wheezing, headaches. \par He was diagnosed with bronchitis and was prescribed with inhaler, steroid.\par States had some improvement. Denies any fever, sick contacts, change in Appetite\par \par Pt feels winded easily. Denies any vomiting, diarrhea Finished course of steroid medication 2 days ago. Continues to use inhaler.\par Also c/o R Ear pain. Felt dry and was unable to hear clearly. Does use Qtips.\par Continues to have R thumb pain. Feels swollen.

## 2023-04-07 NOTE — ADDENDUM
[FreeTextEntry1] : I, Shereen Florence, acted as a scribe on behalf of Dr. Bonifacio Granado MD, on 04/07/2023. \par \par All medical entries made by the scribe were at my, Dr. Bonifacio Granado MD, direction and personally dictated by me on 04/07/2023. I have reviewed the chart and agree that the record accurately reflects my personal performance of the history, physical exam, assessment and plan. I have also personally directed, reviewed, and agreed with the chart.

## 2023-04-07 NOTE — PHYSICAL EXAM
[Normal] : normal rate, regular rhythm, normal S1 and S2 and no murmur heard [de-identified] : Erythematous and inflamed R ear canal [de-identified] : tenderness over radial wrist-finkelsterin test negative, tenderness over MCP joint

## 2023-04-24 ENCOUNTER — APPOINTMENT (OUTPATIENT)
Dept: RADIOLOGY | Facility: IMAGING CENTER | Age: 43
End: 2023-04-24
Payer: COMMERCIAL

## 2023-04-24 ENCOUNTER — OUTPATIENT (OUTPATIENT)
Dept: OUTPATIENT SERVICES | Facility: HOSPITAL | Age: 43
LOS: 1 days | End: 2023-04-24
Payer: COMMERCIAL

## 2023-04-24 DIAGNOSIS — M25.539 PAIN IN UNSPECIFIED WRIST: ICD-10-CM

## 2023-04-24 PROCEDURE — 73130 X-RAY EXAM OF HAND: CPT | Mod: 26,RT

## 2023-04-24 PROCEDURE — 73110 X-RAY EXAM OF WRIST: CPT | Mod: 26,RT

## 2023-04-24 PROCEDURE — 73110 X-RAY EXAM OF WRIST: CPT

## 2023-04-24 PROCEDURE — 73130 X-RAY EXAM OF HAND: CPT

## 2023-05-01 ENCOUNTER — RX RENEWAL (OUTPATIENT)
Age: 43
End: 2023-05-01

## 2023-10-05 ENCOUNTER — APPOINTMENT (OUTPATIENT)
Dept: ORTHOPEDIC SURGERY | Facility: CLINIC | Age: 43
End: 2023-10-05

## 2023-11-16 ENCOUNTER — APPOINTMENT (OUTPATIENT)
Dept: INTERNAL MEDICINE | Facility: CLINIC | Age: 43
End: 2023-11-16
Payer: COMMERCIAL

## 2023-11-16 ENCOUNTER — NON-APPOINTMENT (OUTPATIENT)
Age: 43
End: 2023-11-16

## 2023-11-16 VITALS
HEIGHT: 71 IN | TEMPERATURE: 97.7 F | OXYGEN SATURATION: 99 % | WEIGHT: 252 LBS | SYSTOLIC BLOOD PRESSURE: 137 MMHG | BODY MASS INDEX: 35.28 KG/M2 | HEART RATE: 75 BPM | DIASTOLIC BLOOD PRESSURE: 87 MMHG

## 2023-11-16 DIAGNOSIS — Z00.00 ENCOUNTER FOR GENERAL ADULT MEDICAL EXAMINATION W/OUT ABNORMAL FINDINGS: ICD-10-CM

## 2023-11-16 PROCEDURE — 99396 PREV VISIT EST AGE 40-64: CPT | Mod: 25

## 2023-11-16 PROCEDURE — 36415 COLL VENOUS BLD VENIPUNCTURE: CPT

## 2023-11-16 RX ORDER — NEOMYCIN SULFATE, POLYMYXIN B SULFATE AND HYDROCORTISONE 3.5; 10000; 1 MG/ML; [IU]/ML; MG/ML
3.5-10000-1 SOLUTION AURICULAR (OTIC) 4 TIMES DAILY
Qty: 1 | Refills: 0 | Status: DISCONTINUED | COMMUNITY
Start: 2023-04-07 | End: 2023-11-16

## 2023-11-16 RX ORDER — FLUTICASONE PROPIONATE 50 UG/1
50 SPRAY, METERED NASAL DAILY
Qty: 48 | Refills: 0 | Status: DISCONTINUED | COMMUNITY
Start: 2021-04-22 | End: 2023-11-16

## 2023-11-16 RX ORDER — FLUTICASONE PROPIONATE 50 UG/1
50 SPRAY, METERED NASAL
Qty: 48 | Refills: 0 | Status: DISCONTINUED | COMMUNITY
Start: 2021-11-16 | End: 2023-11-16

## 2023-11-16 RX ORDER — CYCLOBENZAPRINE HYDROCHLORIDE 10 MG/1
10 TABLET, FILM COATED ORAL
Qty: 30 | Refills: 0 | Status: DISCONTINUED | COMMUNITY
Start: 2022-02-21 | End: 2023-11-16

## 2023-11-16 RX ORDER — ALBUTEROL SULFATE 90 UG/1
108 (90 BASE) INHALANT RESPIRATORY (INHALATION)
Qty: 25.5 | Refills: 0 | Status: DISCONTINUED | COMMUNITY
Start: 2021-05-13 | End: 2023-11-16

## 2023-11-16 RX ORDER — BUDESONIDE, GLYCOPYRROLATE, AND FORMOTEROL FUMARATE 160; 9; 4.8 UG/1; UG/1; UG/1
160-9-4.8 AEROSOL, METERED RESPIRATORY (INHALATION)
Qty: 1 | Refills: 3 | Status: DISCONTINUED | COMMUNITY
Start: 2021-11-16 | End: 2023-11-16

## 2023-11-16 RX ORDER — IPRATROPIUM BROMIDE 21 UG/1
0.03 SPRAY NASAL
Qty: 60 | Refills: 0 | Status: DISCONTINUED | COMMUNITY
Start: 2021-11-16 | End: 2023-11-16

## 2023-11-16 RX ORDER — MELOXICAM 15 MG/1
15 TABLET ORAL
Qty: 90 | Refills: 0 | Status: DISCONTINUED | COMMUNITY
Start: 2023-05-01 | End: 2023-11-16

## 2023-11-16 RX ORDER — CICLOPIROX 80 MG/ML
8 SOLUTION TOPICAL
Qty: 1 | Refills: 3 | Status: DISCONTINUED | COMMUNITY
Start: 2022-09-29 | End: 2023-11-16

## 2023-11-16 RX ORDER — MONTELUKAST 10 MG/1
10 TABLET, FILM COATED ORAL
Qty: 90 | Refills: 0 | Status: DISCONTINUED | COMMUNITY
Start: 2021-10-05 | End: 2023-11-16

## 2023-11-16 RX ORDER — BUDESONIDE AND FORMOTEROL FUMARATE DIHYDRATE 160; 4.5 UG/1; UG/1
160-4.5 AEROSOL RESPIRATORY (INHALATION)
Qty: 1 | Refills: 3 | Status: DISCONTINUED | COMMUNITY
Start: 2021-10-05 | End: 2023-11-16

## 2023-11-16 RX ORDER — DICLOFENAC SODIUM 100 MG/1
100 TABLET, FILM COATED, EXTENDED RELEASE ORAL
Qty: 90 | Refills: 0 | Status: DISCONTINUED | COMMUNITY
Start: 2022-02-21 | End: 2023-11-16

## 2023-11-16 RX ORDER — DOXYCYCLINE HYCLATE 100 MG/1
100 TABLET ORAL
Qty: 14 | Refills: 0 | Status: DISCONTINUED | COMMUNITY
Start: 2021-11-16 | End: 2023-11-16

## 2023-11-22 DIAGNOSIS — E78.5 HYPERLIPIDEMIA, UNSPECIFIED: ICD-10-CM

## 2023-11-22 LAB
25(OH)D3 SERPL-MCNC: 20.1 NG/ML
ALBUMIN SERPL ELPH-MCNC: 4.8 G/DL
ALP BLD-CCNC: 62 U/L
ALT SERPL-CCNC: 37 U/L
ANION GAP SERPL CALC-SCNC: 11 MMOL/L
APPEARANCE: CLEAR
AST SERPL-CCNC: 23 U/L
BASOPHILS # BLD AUTO: 0.05 K/UL
BASOPHILS NFR BLD AUTO: 0.7 %
BILIRUB SERPL-MCNC: 0.3 MG/DL
BILIRUBIN URINE: NEGATIVE
BLOOD URINE: NEGATIVE
BUN SERPL-MCNC: 17 MG/DL
CALCIUM SERPL-MCNC: 9.8 MG/DL
CHLORIDE SERPL-SCNC: 100 MMOL/L
CHOLEST SERPL-MCNC: 247 MG/DL
CO2 SERPL-SCNC: 28 MMOL/L
COLOR: YELLOW
CREAT SERPL-MCNC: 0.95 MG/DL
EGFR: 101 ML/MIN/1.73M2
EOSINOPHIL # BLD AUTO: 0.13 K/UL
EOSINOPHIL NFR BLD AUTO: 1.8 %
ESTIMATED AVERAGE GLUCOSE: 160 MG/DL
GLUCOSE QUALITATIVE U: NEGATIVE MG/DL
GLUCOSE SERPL-MCNC: 114 MG/DL
HBA1C MFR BLD HPLC: 7.2 %
HCT VFR BLD CALC: 44.5 %
HDLC SERPL-MCNC: 70 MG/DL
HGB BLD-MCNC: 13 G/DL
IMM GRANULOCYTES NFR BLD AUTO: 0.4 %
KETONES URINE: NEGATIVE MG/DL
LDLC SERPL CALC-MCNC: 164 MG/DL
LEUKOCYTE ESTERASE URINE: NEGATIVE
LYMPHOCYTES # BLD AUTO: 2.46 K/UL
LYMPHOCYTES NFR BLD AUTO: 34.2 %
MAN DIFF?: NORMAL
MCHC RBC-ENTMCNC: 22.1 PG
MCHC RBC-ENTMCNC: 29.2 GM/DL
MCV RBC AUTO: 75.6 FL
MONOCYTES # BLD AUTO: 0.5 K/UL
MONOCYTES NFR BLD AUTO: 6.9 %
NEUTROPHILS # BLD AUTO: 4.03 K/UL
NEUTROPHILS NFR BLD AUTO: 56 %
NITRITE URINE: NEGATIVE
NONHDLC SERPL-MCNC: 176 MG/DL
PH URINE: 6.5
PLATELET # BLD AUTO: 241 K/UL
POTASSIUM SERPL-SCNC: 4.8 MMOL/L
PROT SERPL-MCNC: 7.8 G/DL
PROTEIN URINE: NEGATIVE MG/DL
PSA SERPL-MCNC: 0.46 NG/ML
RBC # BLD: 5.89 M/UL
RBC # FLD: 18.1 %
SODIUM SERPL-SCNC: 140 MMOL/L
SPECIFIC GRAVITY URINE: 1.02
TESTOST FREE SERPL-MCNC: 3.9 PG/ML
TESTOST SERPL-MCNC: 230 NG/DL
TRIGL SERPL-MCNC: 72 MG/DL
TSH SERPL-ACNC: 1.19 UIU/ML
UROBILINOGEN URINE: 0.2 MG/DL
VIT B12 SERPL-MCNC: 546 PG/ML
WBC # FLD AUTO: 7.2 K/UL

## 2023-11-22 RX ORDER — ATORVASTATIN CALCIUM 10 MG/1
10 TABLET, FILM COATED ORAL
Qty: 1 | Refills: 3 | Status: ACTIVE | COMMUNITY
Start: 2023-11-22 | End: 1900-01-01

## 2023-11-30 ENCOUNTER — APPOINTMENT (OUTPATIENT)
Dept: INTERNAL MEDICINE | Facility: CLINIC | Age: 43
End: 2023-11-30
Payer: COMMERCIAL

## 2023-11-30 PROCEDURE — 36415 COLL VENOUS BLD VENIPUNCTURE: CPT

## 2023-12-07 DIAGNOSIS — R79.89 OTHER SPECIFIED ABNORMAL FINDINGS OF BLOOD CHEMISTRY: ICD-10-CM

## 2023-12-07 LAB
FSH SERPL-MCNC: 8 IU/L
LH SERPL-ACNC: 6.7 IU/L
PROLACTIN SERPL-MCNC: 7.3 NG/ML
TESTOST FREE SERPL-MCNC: 4.1 PG/ML
TESTOST SERPL-MCNC: 256 NG/DL

## 2024-01-16 ENCOUNTER — APPOINTMENT (OUTPATIENT)
Dept: MRI IMAGING | Facility: CLINIC | Age: 44
End: 2024-01-16
Payer: COMMERCIAL

## 2024-01-16 PROCEDURE — A9585: CPT

## 2024-01-16 PROCEDURE — 70553 MRI BRAIN STEM W/O & W/DYE: CPT

## 2024-01-24 ENCOUNTER — NON-APPOINTMENT (OUTPATIENT)
Age: 44
End: 2024-01-24

## 2024-01-25 ENCOUNTER — NON-APPOINTMENT (OUTPATIENT)
Age: 44
End: 2024-01-25

## 2024-01-31 DIAGNOSIS — U09.9 POST COVID-19 CONDITION, UNSPECIFIED: ICD-10-CM

## 2024-01-31 RX ORDER — ALBUTEROL SULFATE 90 UG/1
108 (90 BASE) INHALANT RESPIRATORY (INHALATION) EVERY 4 HOURS
Qty: 1 | Refills: 3 | Status: ACTIVE | COMMUNITY
Start: 2024-01-31 | End: 1900-01-01

## 2024-04-05 ENCOUNTER — RX RENEWAL (OUTPATIENT)
Age: 44
End: 2024-04-05

## 2024-04-05 ENCOUNTER — APPOINTMENT (OUTPATIENT)
Dept: INTERNAL MEDICINE | Facility: CLINIC | Age: 44
End: 2024-04-05
Payer: COMMERCIAL

## 2024-04-05 VITALS
OXYGEN SATURATION: 99 % | SYSTOLIC BLOOD PRESSURE: 124 MMHG | TEMPERATURE: 97.9 F | HEIGHT: 71 IN | BODY MASS INDEX: 35 KG/M2 | WEIGHT: 250 LBS | DIASTOLIC BLOOD PRESSURE: 81 MMHG | HEART RATE: 68 BPM

## 2024-04-05 DIAGNOSIS — F41.8 OTHER SPECIFIED ANXIETY DISORDERS: ICD-10-CM

## 2024-04-05 PROCEDURE — 99214 OFFICE O/P EST MOD 30 MIN: CPT

## 2024-04-05 RX ORDER — PROPRANOLOL HYDROCHLORIDE 20 MG/1
20 TABLET ORAL
Qty: 90 | Refills: 0 | Status: ACTIVE | COMMUNITY
Start: 2024-04-05 | End: 1900-01-01

## 2024-04-05 NOTE — ASSESSMENT
[FreeTextEntry1] : Most likely situational anxiety. -Behavioral health location/information provided. -Medication treatment options discussed. To start Propranolol. Pt verbalized understanding.

## 2024-04-05 NOTE — HISTORY OF PRESENT ILLNESS
[FreeTextEntry1] : Patient presents for follow-up.  [de-identified] : Patient gets anxiety a/w palpitation during meetings.  Sometimes would have to leave the meeting.  Anxiety has been more frequently. Reports wife gets notified if increased HR via frances. Pt first noticed anxiety symptoms around 5-6 years ago. Denies any alcohol/drug use.

## 2024-04-05 NOTE — ADDENDUM
[FreeTextEntry1] : I, Shereen Florence, acted as a scribe on behalf of Dr. Bonifacio Granado MD, on 04/05/2024.   All medical entries made by the scribe were at my, Dr. Bonifacio Granado MD, direction and personally dictated by me on 04/05/2024. I have reviewed the chart and agree that the record accurately reflects my personal performance of the history, physical exam, assessment and plan. I have also personally directed, reviewed, and agreed with the chart.

## 2024-05-07 ENCOUNTER — APPOINTMENT (OUTPATIENT)
Dept: ENDOCRINOLOGY | Facility: CLINIC | Age: 44
End: 2024-05-07

## 2024-07-26 ENCOUNTER — APPOINTMENT (OUTPATIENT)
Dept: INTERNAL MEDICINE | Facility: CLINIC | Age: 44
End: 2024-07-26
Payer: COMMERCIAL

## 2024-07-26 VITALS
TEMPERATURE: 98 F | SYSTOLIC BLOOD PRESSURE: 131 MMHG | HEIGHT: 71 IN | OXYGEN SATURATION: 97 % | WEIGHT: 250 LBS | HEART RATE: 76 BPM | BODY MASS INDEX: 35 KG/M2 | DIASTOLIC BLOOD PRESSURE: 90 MMHG

## 2024-07-26 PROCEDURE — G2211 COMPLEX E/M VISIT ADD ON: CPT

## 2024-07-26 PROCEDURE — 99213 OFFICE O/P EST LOW 20 MIN: CPT

## 2024-07-29 NOTE — ASSESSMENT
[FreeTextEntry1] : Acute Visit: likely gastroenteritis - BP is stable. Continue current management. - If not better, stool study.    - RTO in 3 months    Pt verbalized understanding and will reach should any questions/concerns occur.

## 2024-09-30 ENCOUNTER — APPOINTMENT (OUTPATIENT)
Dept: INTERNAL MEDICINE | Facility: CLINIC | Age: 44
End: 2024-09-30
Payer: COMMERCIAL

## 2024-09-30 VITALS
DIASTOLIC BLOOD PRESSURE: 88 MMHG | WEIGHT: 250 LBS | TEMPERATURE: 97.9 F | OXYGEN SATURATION: 99 % | BODY MASS INDEX: 35 KG/M2 | SYSTOLIC BLOOD PRESSURE: 133 MMHG | HEART RATE: 84 BPM | HEIGHT: 71 IN

## 2024-09-30 DIAGNOSIS — M25.549 PAIN IN JOINTS OF UNSPECIFIED HAND: ICD-10-CM

## 2024-09-30 PROCEDURE — 36415 COLL VENOUS BLD VENIPUNCTURE: CPT

## 2024-09-30 PROCEDURE — G2211 COMPLEX E/M VISIT ADD ON: CPT | Mod: NC

## 2024-09-30 PROCEDURE — 99213 OFFICE O/P EST LOW 20 MIN: CPT

## 2024-09-30 RX ORDER — MELOXICAM 15 MG/1
15 TABLET ORAL
Qty: 30 | Refills: 0 | Status: ACTIVE | COMMUNITY
Start: 2024-09-30 | End: 1900-01-01

## 2024-09-30 NOTE — ADDENDUM
[FreeTextEntry1] : I, Lavern Mc, acted as a scribe on behalf of Dr. Bonifacio Granado MD, on 09/30/2024.   All medical entries made by the scribe were at my, Dr. Bonifacio Granado MD, direction and personally dictated by me on 09/30/2024. I have reviewed the chart and agree that the record accurately reflects my personal performance of the history, physical exam, assessment and plan. I have also personally directed, reviewed, and agreed with the chart.

## 2024-09-30 NOTE — ASSESSMENT
[FreeTextEntry1] : Acute Visit: hand joint pain - BP is stable. Continue current management. - Check borrelia burgdorferi, uric acid serum.  - Rx Meloxicam 15mg - Ordered Xray right hand finger, hand   - RTO in 3 months    Pt verbalized understanding and will reach should any questions/concerns occur.

## 2024-09-30 NOTE — HISTORY OF PRESENT ILLNESS
[FreeTextEntry8] : Patient is a 44 yr old male present today for an acute visit for swollen right second digit and joint. Pt is unsure how it happened was cleaning garage, reports sharp pains, not erythematous. Reports remainder joints are okay Pt reports no longer taking diclofenac. Pt does report benching, unsure if hit or twisted inappropriately. Discussed splint use and ice compresses until bloodwork results, will test uric acid.   Denies any CP, chest tightness or SOB. Denies any abdominal pain, urinary symptom, or change in bowel habits. Denies any fever, chills, or night sweats.

## 2024-10-01 ENCOUNTER — APPOINTMENT (OUTPATIENT)
Dept: RADIOLOGY | Facility: IMAGING CENTER | Age: 44
End: 2024-10-01

## 2024-10-01 ENCOUNTER — OUTPATIENT (OUTPATIENT)
Dept: OUTPATIENT SERVICES | Facility: HOSPITAL | Age: 44
LOS: 1 days | End: 2024-10-01
Payer: COMMERCIAL

## 2024-10-01 DIAGNOSIS — M25.549 PAIN IN JOINTS OF UNSPECIFIED HAND: ICD-10-CM

## 2024-10-01 PROCEDURE — 73130 X-RAY EXAM OF HAND: CPT | Mod: 26,RT

## 2024-10-01 PROCEDURE — 73130 X-RAY EXAM OF HAND: CPT

## 2024-10-01 PROCEDURE — 73140 X-RAY EXAM OF FINGER(S): CPT

## 2024-10-08 LAB
B BURGDOR AB SER-IMP: NEGATIVE
B BURGDOR IGG+IGM SER QL: 0.12 INDEX
URATE SERPL-MCNC: 4.8 MG/DL

## 2025-01-02 ENCOUNTER — APPOINTMENT (OUTPATIENT)
Dept: INTERNAL MEDICINE | Facility: CLINIC | Age: 45
End: 2025-01-02

## 2025-01-02 ENCOUNTER — NON-APPOINTMENT (OUTPATIENT)
Age: 45
End: 2025-01-02

## 2025-01-02 VITALS
OXYGEN SATURATION: 98 % | DIASTOLIC BLOOD PRESSURE: 91 MMHG | HEART RATE: 76 BPM | TEMPERATURE: 98.1 F | HEIGHT: 71 IN | SYSTOLIC BLOOD PRESSURE: 136 MMHG | WEIGHT: 254 LBS | BODY MASS INDEX: 35.56 KG/M2

## 2025-01-02 VITALS — DIASTOLIC BLOOD PRESSURE: 82 MMHG | SYSTOLIC BLOOD PRESSURE: 130 MMHG

## 2025-01-02 DIAGNOSIS — Z11.3 ENCOUNTER FOR SCREENING FOR INFECTIONS WITH A PREDOMINANTLY SEXUAL MODE OF TRANSMISSION: ICD-10-CM

## 2025-01-02 DIAGNOSIS — R79.89 OTHER SPECIFIED ABNORMAL FINDINGS OF BLOOD CHEMISTRY: ICD-10-CM

## 2025-01-02 DIAGNOSIS — E11.9 TYPE 2 DIABETES MELLITUS W/OUT COMPLICATIONS: ICD-10-CM

## 2025-01-02 DIAGNOSIS — Z00.00 ENCOUNTER FOR GENERAL ADULT MEDICAL EXAMINATION W/OUT ABNORMAL FINDINGS: ICD-10-CM

## 2025-01-02 DIAGNOSIS — N48.1 BALANITIS: ICD-10-CM

## 2025-01-02 PROCEDURE — 99396 PREV VISIT EST AGE 40-64: CPT | Mod: 25

## 2025-01-02 PROCEDURE — G0008: CPT

## 2025-01-02 PROCEDURE — 90656 IIV3 VACC NO PRSV 0.5 ML IM: CPT

## 2025-01-02 PROCEDURE — 36415 COLL VENOUS BLD VENIPUNCTURE: CPT

## 2025-01-02 PROCEDURE — G0444 DEPRESSION SCREEN ANNUAL: CPT | Mod: 59

## 2025-01-02 RX ORDER — BLOOD SUGAR DIAGNOSTIC
STRIP MISCELLANEOUS
Qty: 2 | Refills: 3 | Status: ACTIVE | COMMUNITY
Start: 2025-01-02 | End: 1900-01-01

## 2025-01-02 RX ORDER — BLOOD-GLUCOSE METER
W/DEVICE EACH MISCELLANEOUS
Qty: 1 | Refills: 0 | Status: ACTIVE | COMMUNITY
Start: 2025-01-02 | End: 1900-01-01

## 2025-01-02 RX ORDER — TRIAMCINOLONE ACETONIDE 1 MG/G
0.1 CREAM TOPICAL 3 TIMES DAILY
Qty: 1 | Refills: 0 | Status: ACTIVE | COMMUNITY
Start: 2025-01-02 | End: 1900-01-01

## 2025-01-03 LAB
25(OH)D3 SERPL-MCNC: 22.4 NG/ML
ALBUMIN SERPL ELPH-MCNC: 4.5 G/DL
ALP BLD-CCNC: 68 U/L
ALT SERPL-CCNC: 44 U/L
ANION GAP SERPL CALC-SCNC: 11 MMOL/L
APPEARANCE: CLEAR
AST SERPL-CCNC: 32 U/L
BASOPHILS # BLD AUTO: 0.04 K/UL
BASOPHILS NFR BLD AUTO: 0.7 %
BILIRUB SERPL-MCNC: 0.3 MG/DL
BILIRUBIN URINE: NEGATIVE
BLOOD URINE: NEGATIVE
BUN SERPL-MCNC: 14 MG/DL
C TRACH RRNA SPEC QL NAA+PROBE: NOT DETECTED
CALCIUM SERPL-MCNC: 9.5 MG/DL
CHLORIDE SERPL-SCNC: 100 MMOL/L
CHOLEST SERPL-MCNC: 222 MG/DL
CO2 SERPL-SCNC: 26 MMOL/L
COLOR: YELLOW
CREAT SERPL-MCNC: 0.81 MG/DL
EGFR: 110 ML/MIN/1.73M2
EOSINOPHIL # BLD AUTO: 0.13 K/UL
EOSINOPHIL NFR BLD AUTO: 2.1 %
ESTIMATED AVERAGE GLUCOSE: 197 MG/DL
GLUCOSE QUALITATIVE U: >=1000 MG/DL
GLUCOSE SERPL-MCNC: 197 MG/DL
HAV IGM SER QL: NONREACTIVE
HBA1C MFR BLD HPLC: 8.5 %
HBV CORE IGM SER QL: NONREACTIVE
HBV SURFACE AG SER QL: NONREACTIVE
HCT VFR BLD CALC: 40.7 %
HCV AB SER QL: NONREACTIVE
HCV S/CO RATIO: 0.12 S/CO
HDLC SERPL-MCNC: 48 MG/DL
HGB BLD-MCNC: 12.2 G/DL
HIV1+2 AB SPEC QL IA.RAPID: NONREACTIVE
IMM GRANULOCYTES NFR BLD AUTO: 0.3 %
KETONES URINE: NEGATIVE MG/DL
LDLC SERPL CALC-MCNC: 146 MG/DL
LEUKOCYTE ESTERASE URINE: NEGATIVE
LYMPHOCYTES # BLD AUTO: 1.95 K/UL
LYMPHOCYTES NFR BLD AUTO: 32.1 %
MAN DIFF?: NORMAL
MCHC RBC-ENTMCNC: 21.8 PG
MCHC RBC-ENTMCNC: 30 G/DL
MCV RBC AUTO: 72.8 FL
MONOCYTES # BLD AUTO: 0.37 K/UL
MONOCYTES NFR BLD AUTO: 6.1 %
N GONORRHOEA RRNA SPEC QL NAA+PROBE: NOT DETECTED
NEUTROPHILS # BLD AUTO: 3.57 K/UL
NEUTROPHILS NFR BLD AUTO: 58.7 %
NITRITE URINE: NEGATIVE
NONHDLC SERPL-MCNC: 174 MG/DL
PH URINE: 6.5
PLATELET # BLD AUTO: 212 K/UL
POTASSIUM SERPL-SCNC: 4.4 MMOL/L
PROT SERPL-MCNC: 7.3 G/DL
PROTEIN URINE: NEGATIVE MG/DL
PSA SERPL-MCNC: 0.43 NG/ML
RBC # BLD: 5.59 M/UL
RBC # FLD: 15.1 %
SODIUM SERPL-SCNC: 137 MMOL/L
SOURCE AMPLIFICATION: NORMAL
SPECIFIC GRAVITY URINE: 1.03
T PALLIDUM AB SER QL IA: NEGATIVE
TESTOST SERPL-MCNC: 320 NG/DL
TRIGL SERPL-MCNC: 158 MG/DL
TSH SERPL-ACNC: 0.91 UIU/ML
UROBILINOGEN URINE: 0.2 MG/DL
VIT B12 SERPL-MCNC: 568 PG/ML
WBC # FLD AUTO: 6.08 K/UL

## 2025-01-09 LAB
MYCOPLASMA HOMINIS CULTURE: NEGATIVE
UREAPLASMA CULTURE: NEGATIVE

## 2025-01-14 RX ORDER — METFORMIN HYDROCHLORIDE 500 MG/1
500 TABLET, COATED ORAL
Qty: 180 | Refills: 2 | Status: ACTIVE | COMMUNITY
Start: 2025-01-14 | End: 1900-01-01

## 2025-07-31 ENCOUNTER — APPOINTMENT (OUTPATIENT)
Dept: INTERNAL MEDICINE | Facility: CLINIC | Age: 45
End: 2025-07-31
Payer: COMMERCIAL

## 2025-07-31 VITALS
HEART RATE: 81 BPM | OXYGEN SATURATION: 97 % | WEIGHT: 252 LBS | SYSTOLIC BLOOD PRESSURE: 136 MMHG | DIASTOLIC BLOOD PRESSURE: 86 MMHG | TEMPERATURE: 98 F | HEIGHT: 71 IN | BODY MASS INDEX: 35.28 KG/M2

## 2025-07-31 DIAGNOSIS — M25.812 OTHER SPECIFIED JOINT DISORDERS, LEFT SHOULDER: ICD-10-CM

## 2025-07-31 PROCEDURE — G2211 COMPLEX E/M VISIT ADD ON: CPT | Mod: NC

## 2025-07-31 PROCEDURE — 99213 OFFICE O/P EST LOW 20 MIN: CPT

## 2025-07-31 RX ORDER — MELOXICAM 15 MG/1
15 TABLET ORAL
Qty: 1 | Refills: 0 | Status: ACTIVE | COMMUNITY
Start: 2025-07-31 | End: 1900-01-01